# Patient Record
Sex: MALE | Race: WHITE | NOT HISPANIC OR LATINO | Employment: OTHER | URBAN - METROPOLITAN AREA
[De-identification: names, ages, dates, MRNs, and addresses within clinical notes are randomized per-mention and may not be internally consistent; named-entity substitution may affect disease eponyms.]

---

## 2021-02-09 ENCOUNTER — HOSPITAL ENCOUNTER (OUTPATIENT)
Dept: WOUND CARE | Facility: CLINIC | Age: 76
Discharge: HOME | End: 2021-02-09
Attending: FAMILY MEDICINE | Admitting: FAMILY MEDICINE
Payer: COMMERCIAL

## 2021-02-09 PROCEDURE — G0463 HOSPITAL OUTPT CLINIC VISIT: HCPCS

## 2022-07-08 ENCOUNTER — OFFICE VISIT (OUTPATIENT)
Dept: URGENT CARE | Facility: URGENT CARE | Age: 77
End: 2022-07-08
Payer: COMMERCIAL

## 2022-07-08 VITALS
SYSTOLIC BLOOD PRESSURE: 130 MMHG | HEART RATE: 83 BPM | WEIGHT: 203.4 LBS | HEIGHT: 72 IN | TEMPERATURE: 97.8 F | OXYGEN SATURATION: 96 % | DIASTOLIC BLOOD PRESSURE: 75 MMHG | BODY MASS INDEX: 27.55 KG/M2

## 2022-07-08 DIAGNOSIS — S51.812A SKIN TEAR OF LEFT FOREARM WITHOUT COMPLICATION, INITIAL ENCOUNTER: ICD-10-CM

## 2022-07-08 DIAGNOSIS — L03.114 CELLULITIS OF LEFT UPPER EXTREMITY: Primary | ICD-10-CM

## 2022-07-08 PROCEDURE — 99203 OFFICE O/P NEW LOW 30 MIN: CPT | Performed by: STUDENT IN AN ORGANIZED HEALTH CARE EDUCATION/TRAINING PROGRAM

## 2022-07-08 RX ORDER — ATORVASTATIN CALCIUM 20 MG/1
TABLET, FILM COATED ORAL
COMMUNITY
Start: 2022-05-31

## 2022-07-08 RX ORDER — CEPHALEXIN 500 MG/1
500 CAPSULE ORAL 3 TIMES DAILY
Qty: 21 CAPSULE | Refills: 0 | Status: SHIPPED | OUTPATIENT
Start: 2022-07-08 | End: 2022-07-15

## 2022-07-08 NOTE — PROGRESS NOTES
Assessment & Plan     Cellulitis of left upper extremity  Skin tear of left forearm without complication, initial encounter  Infection of tissue surrounding skin tear. Cleansed skin tear with sterile water and trimmed pieces of skin that were no longer viable and placed bacitracin and nonadherent bandage and wrapped with Coban. Will treat infection with antibiotic and advised to monitor for spreading of redness and to return to clinic if this occurs. Patient agrees with plan of care.   - cephALEXin (KEFLEX) 500 MG capsule  Dispense: 21 capsule; Refill: 0         20 minutes spent on the date of the encounter doing chart review, patient visit and documentation         No follow-ups on file.    Nubia White, WILMA [facility] is a 76 year old male who presents to clinic today for the following health issues:  Chief Complaint   Patient presents with     Arm Injury     PT SAID THAT HE SCRAPE HIS SKIN ON HIS BOAT ON Wednesday.     HPI      No chronic conditions    Review of Systems  Constitutional, HEENT, cardiovascular, pulmonary, gi and gu systems are negative, except as otherwise noted.      Objective    /75 (BP Location: Right arm, Patient Position: Sitting, Cuff Size: Adult Regular)   Pulse 83   Temp 97.8  F (36.6  C) (Tympanic)   Ht 1.829 m (6')   Wt 92.3 kg (203 lb 6.4 oz)   SpO2 96%   BMI 27.59 kg/m    Physical Exam   GENERAL APPEARANCE: alert and no distress  MS: extremities normal- no gross deformities noted  SKIN: skin tear of left forearm with surrounding erythema, mild induration and warmth  NEURO: Normal strength and tone, mentation intact and speech normal